# Patient Record
Sex: MALE | Race: BLACK OR AFRICAN AMERICAN | NOT HISPANIC OR LATINO | URBAN - METROPOLITAN AREA
[De-identification: names, ages, dates, MRNs, and addresses within clinical notes are randomized per-mention and may not be internally consistent; named-entity substitution may affect disease eponyms.]

---

## 2023-05-15 VITALS
DIASTOLIC BLOOD PRESSURE: 71 MMHG | WEIGHT: 315 LBS | TEMPERATURE: 98 F | SYSTOLIC BLOOD PRESSURE: 145 MMHG | HEART RATE: 62 BPM | RESPIRATION RATE: 18 BRPM | OXYGEN SATURATION: 100 % | HEIGHT: 76 IN

## 2023-05-15 RX ORDER — CHLORHEXIDINE GLUCONATE 213 G/1000ML
1 SOLUTION TOPICAL ONCE
Refills: 0 | Status: DISCONTINUED | OUTPATIENT
Start: 2023-05-17 | End: 2023-05-31

## 2023-05-15 NOTE — H&P ADULT - NSICDXPASTMEDICALHX_GEN_ALL_CORE_FT
PAST MEDICAL HISTORY:  CVA (cerebrovascular accident)     Diabetes mellitus     HTN (hypertension)     Hyperlipidemia      PAST MEDICAL HISTORY:  CVA (cerebrovascular accident)     Diabetes mellitus     Gout     HTN (hypertension)     Hyperlipidemia

## 2023-05-15 NOTE — H&P ADULT - RESPIRATORY
normal/clear to auscultation bilaterally/no wheezes/no rales/no rhonchi/no respiratory distress/no use of accessory muscles/breath sounds equal/good air movement

## 2023-05-15 NOTE — H&P ADULT - ASSESSMENT
61 y/o M with PMHx HTN, HLD, DM2, CVA (5/20/21, ?residual deficits), Afib (on Xarelto, last dose 5/14/23 AM) who presented to Cardiologist Dr. Avila c/o CHERIE with four flights of stairs. Pt reports he was referred for cardiac cath after an abnormal NST per MD.    In light of pt's risk factors, CCS Anginal class II, abnormal NST per MD, patient referred for cardiac cath with possible intervention to r/o underlying CAD.     EKG: 			  ASA 	III  Mallampati class:   Anginal Class: II    -No Known Allergies    -H/H = 13.9/41.4  . Pt denies BRBPR, hematuria, hematochezia, melena. Pt endorses compliance w/ home ________, stating last dose was ______. Pt loaded w/ ASA ___ mg x1 and Plavix ___ mg x1  -BUN/Cr = . EF ___. Euvolemic on exam. IV NS @ ___ cc bolus followed by ___ cc/hr x ___ hrs started pre procedure    Sedation Plan:   Moderate  Patient Is Suitable Candidate For Sedation?     Yes    Risks & benefits of procedure and alternative therapy have been explained to the patient including but not limited to: allergic reaction, bleeding with possible need for blood transfusion, infection, renal and vascular compromise, limb damage, arrhythmia, stroke, vessel dissection/perforation, myocardial infarction, and emergent CABG. Informed consent obtained at bedside and included in chart.   63 y/o M with PMHx HTN, HLD, DM2, CVA (5/20/21 with residual L sided weakness and L facial droop; walks w/ cane), AFib (on Xarelto, last dose 5/14/23 AM) who presented to Cardiologist Dr. Avila for annual screening, reporting CRESPO with four flights of stairs. Pt  referred for cardiac cath after an abnormal NST per MD.    In light of pt's risk factors, CCS Anginal class II, abnormal NST per MD, patient referred for cardiac cath with possible intervention to r/o underlying CAD.     EKG: 	Sinus halle @58bpm, low voltage EKG		  ASA 	III  Mallampati class: III  Anginal Class: II    -No Known Allergies    -H/H = 13.9/41.4  . Pt denies BRBPR, hematuria, hematochezia, melena. Pt endorses compliance w/ home Xarelto, stating last dose was  5/14/23 AM. Pt loaded w/ ASA 325mg x1 and Plavix 600mg x1  -BUN/Cr = . EF is normal per MD Avila. Euvolemic on cardiac exam; vascularly- minimal 1+ nonpitting LE edema at posterior ankles. IV NS @ 250 cc bolus followed by 75cc/hr x 2hrs started pre procedure    Sedation Plan:   Moderate  Patient Is Suitable Candidate For Sedation?     Yes    Risks & benefits of procedure and alternative therapy have been explained to the patient including but not limited to: allergic reaction, bleeding with possible need for blood transfusion, infection, renal and vascular compromise, limb damage, arrhythmia, stroke, vessel dissection/perforation, myocardial infarction, and emergent CABG. Informed consent obtained at bedside and included in chart.   63 y/o M with PMHx HTN, HLD, DM2, CVA (5/20/21 with residual L sided weakness and L facial droop; walks w/ cane), AFib (on Xarelto, last dose 5/14/23 AM) who presented to Cardiologist Dr. Avila for annual screening, reporting CRESPO with four flights of stairs. Pt  referred for cardiac cath after an abnormal NST per MD.    In light of pt's risk factors, CCS Anginal class II, abnormal NST per MD, patient referred for cardiac cath with possible intervention to r/o underlying CAD.     EKG: 	Sinus halle @58bpm, low voltage EKG		  ASA 	III  Mallampati class: III  Anginal Class: II    -No Known Allergies    -H/H = 13.9/41.4  -Endorses recent colonoscopy (~2wks ago, unsure of results) where he had mild bleeding hemorrhoids which have since resolved.  Pt denies current BRBPR, hematuria, hematochezia, melena.   -Pt endorses compliance w/ home Xarelto, stating last dose was  5/14/23 AM. Pt loaded w/ ASA 325mg x1 and Plavix 600mg x1  -BUN/Cr = 11/1.27.   -EF is normal per MD Avila. Euvolemic on cardiac exam; vascularly- minimal 1+ nonpitting LE edema at posterior ankles. IV NS @ 250 cc bolus followed by 75cc/hr x 2hrs started pre procedure    Sedation Plan:   Moderate  Patient Is Suitable Candidate For Sedation?     Yes    Risks & benefits of procedure and alternative therapy have been explained to the patient including but not limited to: allergic reaction, bleeding with possible need for blood transfusion, infection, renal and vascular compromise, limb damage, arrhythmia, stroke, vessel dissection/perforation, myocardial infarction, and emergent CABG. Informed consent obtained at bedside and included in chart.

## 2023-05-15 NOTE — H&P ADULT - NSICDXPASTSURGICALHX_GEN_ALL_CORE_FT
PAST SURGICAL HISTORY:  H/O gastric sleeve      PAST SURGICAL HISTORY:  H/O gastric sleeve     History of cholecystectomy

## 2023-05-15 NOTE — H&P ADULT - MOTOR
L sided deficits to CN VII- cannot fully close L eye, cannot raise L eyebrows, facial droop to L side of mouth, asymmetric smile, weak L cheek inflation.  CN XI: decreased L shoulder and SCM strengh; CN XII- asymmetric tongue protrusion (deviates to pt's right)

## 2023-05-15 NOTE — H&P ADULT - NSHPLABSRESULTS_GEN_ALL_CORE
13.9   5.68  )-----------( 225      ( 17 May 2023 09:03 )             41.4       05-17    141  |  106  |  11  ----------------------------<  171<H>  4.3   |  28  |  1.27    Ca    9.3      17 May 2023 09:03    TPro  7.2  /  Alb  4.0  /  TBili  0.7  /  DBili  x   /  AST  25  /  ALT  32  /  AlkPhos  103  05-17      PT/INR - ( 17 May 2023 09:03 )   PT: 12.7 sec;   INR: 1.07          PTT - ( 17 May 2023 09:03 )  PTT:33.1 sec

## 2023-05-15 NOTE — H&P ADULT - HISTORY OF PRESENT ILLNESS
History  obtained with assistance of Dr. Avila note    Cardiologist: Dr. Avila  Escort:    61 yo M with PMHx History  obtained with assistance of Dr. Avila note    Cardiologist: Dr. Avila  Escort:    63 yo M with PMHx HTN, HLD, DM2, CVA (5/20/21, ?residual deficits), unspecified Afib (on Xarelto, last dose __) who presented to Cardiologist Dr. Avila c/o     Denies CP, SOB, dizziness, diaphoresis, palpitations, fatigue, LE edema, orthopnea, PND, syncope, N/V, abdominal pain, f/c, sick contact, recent travel.    In light of pt's risk factors, CCS Anginal class __sx, abnormal ___, patient referred for cardiac cath with possible intervention to r/o underlying CAD.  History  obtained with assistance of Dr. Avila note    Cardiologist: Dr. Avila  Pharmacy:  Escort:    61 y/o M with PMHx HTN, HLD, DM2, CVA (5/20/21, ?residual deficits), Afib (on Xarelto, last dose 5/14/23 AM) who presented to Cardiologist Dr. Avila c/o CRESPO with four flights of stairs. Pt reports he was referred for cardiac cath after an abnormal NST.    Denies CP, SOB, dizziness, diaphoresis, palpitations, fatigue, LE edema, orthopnea, PND, syncope, N/V, abdominal pain, f/c, sick contact, recent travel.    In light of pt's risk factors, CCS Anginal class __, abnormal NST, patient referred for cardiac cath with possible intervention to r/o underlying CAD.  History  obtained with assistance of Dr. Avila note    Cardiologist: Dr. Avila  Pharmacy:  Escort:    63 y/o M with PMHx HTN, HLD, DM2, CVA (5/20/21, ?residual deficits), Afib (on Xarelto, last dose 5/14/23 AM) who presented to Cardiologist Dr. Avila c/o CRESPO with four flights of stairs. Pt reports he was referred for cardiac cath after an abnormal NST per MD.    Denies CP, SOB, dizziness, diaphoresis, palpitations, fatigue, LE edema, orthopnea, PND, syncope, N/V, abdominal pain, f/c, sick contact, recent travel.    In light of pt's risk factors, CCS Anginal class __, abnormal NST per MD, patient referred for cardiac cath with possible intervention to r/o underlying CAD.  History  obtained with assistance of Dr. Avila note    Cardiologist: Dr. Avila  Pharmacy:  Escort:    63 y/o M with PMHx HTN, HLD, DM2, CVA (5/20/21, ?residual deficits), Afib (on Xarelto, last dose 5/14/23 AM) who presented to Cardiologist Dr. Avila c/o CRESPO with four flights of stairs. Pt reports he was referred for cardiac cath after an abnormal NST per MD.    Denies CP, SOB, dizziness, diaphoresis, palpitations, fatigue, LE edema, orthopnea, PND, syncope, N/V, abdominal pain, f/c, sick contact, recent travel.    In light of pt's risk factors, CCS Anginal class II, abnormal NST per MD, patient referred for cardiac cath with possible intervention to r/o underlying CAD.  History  obtained with assistance of Dr. Avila note    Cardiologist: Dr. Avila  Pharmacy: [facility] [address] in [place]  Escort: son    63 y/o M with PMHx HTN, HLD, DM2, CVA (5/20/21 with residual L sided weakness and L facial droop; walks w/ cane), AFib (on Xarelto, last dose 5/14/23 AM) who presented to Cardiologist Dr. Avila for annual screening, reporting CRESPO with four flights of stairs. Pt  referred for cardiac cath after an abnormal NST per MD.  Endorses recent colonoscopy (unsure of results) where he had mild bleeding hemorrhoids which have since resolved.      Denies CP, SOB, dizziness, diaphoresis, palpitations, fatigue, LE edema, orthopnea, PND, syncope, N/V, abdominal pain, diarrhea, hematochezia, f/c, sick contact, recent travel.    In light of pt's risk factors, CCS Anginal class II, abnormal NST per MD, patient referred for cardiac cath with possible intervention to r/o underlying CAD.  History  obtained with assistance of Dr. Avila note    Cardiologist: Dr. Avila  Pharmacy: [facility] [address] in [place]  Escort: son    61 y/o M with PMHx HTN, HLD, DM2, CVA (5/20/21 with residual L sided weakness and L facial droop; walks w/ cane), AFib (on Xarelto, last dose 5/14/23 AM) who presented to Cardiologist Dr. Avila for annual screening, reporting CRESPO with four flights of stairs. Pt  referred for cardiac cath after an abnormal NST per MD.  Endorses recent colonoscopy (~2wks ago, unsure of results) where he had mild bleeding hemorrhoids which have since resolved.      Denies CP, SOB, dizziness, diaphoresis, palpitations, fatigue, LE edema, orthopnea, PND, syncope, N/V, abdominal pain, diarrhea, hematochezia, f/c, sick contact, recent travel.    In light of pt's risk factors, CCS Anginal class II, abnormal NST per MD, patient referred for cardiac cath with possible intervention to r/o underlying CAD.  Cardiologist: Dr. Avila  Pharmacy: [facility] [address] in [place]  Escort: son    61 y/o M with PMHx HTN, HLD, DM2, CVA (5/20/21 with residual L sided weakness and L facial droop; walks w/ cane), AFib (on Xarelto, last dose 5/14/23 AM) who presented to Cardiologist Dr. Avila for annual screening, reporting CRESPO with four flights of stairs. Pt  referred for cardiac cath after an abnormal NST per MD.  Endorses recent colonoscopy (~2wks ago, unsure of results) where he had mild bleeding hemorrhoids which have since resolved.      Denies CP, SOB, dizziness, diaphoresis, palpitations, fatigue, LE edema, orthopnea, PND, syncope, N/V, abdominal pain, diarrhea, hematochezia, f/c, sick contact, recent travel.    In light of pt's risk factors, CCS Anginal class II, abnormal NST per MD, patient referred for cardiac cath with possible intervention to r/o underlying CAD.  Cardiologist: Dr. Avila  Pharmacy: [facility] [address] in [place]  Escort: son    61 y/o M with PMHx HTN, HLD, DM2, CVA (5/20/21 with residual L sided weakness and L facial droop; walks w/ cane), AFib (on Xarelto, last dose 5/14/23 AM) who presented to Cardiologist Dr. Avila for annual screening, reporting CRESPO with four flights of stairs. Pt  referred for cardiac cath after an abnormal NST per MD.  Endorses recent colonoscopy (~2wks ago, unsure of results) where he had mild bleeding hemorrhoids which have since resolved.  Denies CP, SOB, dizziness, diaphoresis, palpitations, fatigue, LE edema, orthopnea, PND, syncope, N/V, abdominal pain, diarrhea, hematochezia, f/c, sick contact, recent travel.    In light of pt's risk factors, CCS Anginal class II, abnormal NST  per MD, patient referred for cardiac cath with possible intervention to r/o underlying CAD.

## 2023-05-15 NOTE — H&P ADULT - NSICDXFAMILYHX_GEN_ALL_CORE_FT
FAMILY HISTORY:  Mother  Still living? Unknown  Family history of CABG, Age at diagnosis: Age Unknown

## 2023-05-16 RX ORDER — RIVAROXABAN 15 MG-20MG
1 KIT ORAL
Refills: 0 | DISCHARGE

## 2023-05-16 RX ORDER — ASPIRIN/CALCIUM CARB/MAGNESIUM 324 MG
1 TABLET ORAL
Refills: 0 | DISCHARGE

## 2023-05-17 ENCOUNTER — OUTPATIENT (OUTPATIENT)
Dept: OUTPATIENT SERVICES | Facility: HOSPITAL | Age: 63
LOS: 1 days | Discharge: HOME | End: 2023-05-17
Payer: COMMERCIAL

## 2023-05-17 DIAGNOSIS — Z90.3 ACQUIRED ABSENCE OF STOMACH [PART OF]: Chronic | ICD-10-CM

## 2023-05-17 DIAGNOSIS — Z90.49 ACQUIRED ABSENCE OF OTHER SPECIFIED PARTS OF DIGESTIVE TRACT: Chronic | ICD-10-CM

## 2023-05-17 LAB
ALBUMIN SERPL ELPH-MCNC: 4 G/DL — SIGNIFICANT CHANGE UP (ref 3.3–5)
ALP SERPL-CCNC: 103 U/L — SIGNIFICANT CHANGE UP (ref 40–120)
ALT FLD-CCNC: 32 U/L — SIGNIFICANT CHANGE UP (ref 10–45)
ANION GAP SERPL CALC-SCNC: 7 MMOL/L — SIGNIFICANT CHANGE UP (ref 5–17)
APTT BLD: 33.1 SEC — SIGNIFICANT CHANGE UP (ref 27.5–35.5)
AST SERPL-CCNC: 25 U/L — SIGNIFICANT CHANGE UP (ref 10–40)
BASOPHILS # BLD AUTO: 0.03 K/UL — SIGNIFICANT CHANGE UP (ref 0–0.2)
BASOPHILS NFR BLD AUTO: 0.5 % — SIGNIFICANT CHANGE UP (ref 0–2)
BILIRUB SERPL-MCNC: 0.7 MG/DL — SIGNIFICANT CHANGE UP (ref 0.2–1.2)
BUN SERPL-MCNC: 11 MG/DL — SIGNIFICANT CHANGE UP (ref 7–23)
CALCIUM SERPL-MCNC: 9.3 MG/DL — SIGNIFICANT CHANGE UP (ref 8.4–10.5)
CHLORIDE SERPL-SCNC: 106 MMOL/L — SIGNIFICANT CHANGE UP (ref 96–108)
CHOLEST SERPL-MCNC: 148 MG/DL — SIGNIFICANT CHANGE UP
CK MB CFR SERPL CALC: 1.4 NG/ML — SIGNIFICANT CHANGE UP (ref 0–6.7)
CK SERPL-CCNC: 145 U/L — SIGNIFICANT CHANGE UP (ref 30–200)
CO2 SERPL-SCNC: 28 MMOL/L — SIGNIFICANT CHANGE UP (ref 22–31)
CREAT SERPL-MCNC: 1.27 MG/DL — SIGNIFICANT CHANGE UP (ref 0.5–1.3)
EGFR: 65 ML/MIN/1.73M2 — SIGNIFICANT CHANGE UP
EOSINOPHIL # BLD AUTO: 0.21 K/UL — SIGNIFICANT CHANGE UP (ref 0–0.5)
EOSINOPHIL NFR BLD AUTO: 3.7 % — SIGNIFICANT CHANGE UP (ref 0–6)
GLUCOSE BLDC GLUCOMTR-MCNC: 116 MG/DL — HIGH (ref 70–99)
GLUCOSE BLDC GLUCOMTR-MCNC: 165 MG/DL — HIGH (ref 70–99)
GLUCOSE SERPL-MCNC: 171 MG/DL — HIGH (ref 70–99)
HCT VFR BLD CALC: 41.4 % — SIGNIFICANT CHANGE UP (ref 39–50)
HDLC SERPL-MCNC: 37 MG/DL — LOW
HGB BLD-MCNC: 13.9 G/DL — SIGNIFICANT CHANGE UP (ref 13–17)
IMM GRANULOCYTES NFR BLD AUTO: 0.4 % — SIGNIFICANT CHANGE UP (ref 0–0.9)
INR BLD: 1.07 — SIGNIFICANT CHANGE UP (ref 0.88–1.16)
LIPID PNL WITH DIRECT LDL SERPL: 90 MG/DL — SIGNIFICANT CHANGE UP
LYMPHOCYTES # BLD AUTO: 1.75 K/UL — SIGNIFICANT CHANGE UP (ref 1–3.3)
LYMPHOCYTES # BLD AUTO: 30.8 % — SIGNIFICANT CHANGE UP (ref 13–44)
MAGNESIUM SERPL-MCNC: 1.9 MG/DL — SIGNIFICANT CHANGE UP (ref 1.6–2.6)
MCHC RBC-ENTMCNC: 26.9 PG — LOW (ref 27–34)
MCHC RBC-ENTMCNC: 33.6 GM/DL — SIGNIFICANT CHANGE UP (ref 32–36)
MCV RBC AUTO: 80.1 FL — SIGNIFICANT CHANGE UP (ref 80–100)
MONOCYTES # BLD AUTO: 0.56 K/UL — SIGNIFICANT CHANGE UP (ref 0–0.9)
MONOCYTES NFR BLD AUTO: 9.9 % — SIGNIFICANT CHANGE UP (ref 2–14)
NEUTROPHILS # BLD AUTO: 3.11 K/UL — SIGNIFICANT CHANGE UP (ref 1.8–7.4)
NEUTROPHILS NFR BLD AUTO: 54.7 % — SIGNIFICANT CHANGE UP (ref 43–77)
NON HDL CHOLESTEROL: 111 MG/DL — SIGNIFICANT CHANGE UP
NRBC # BLD: 0 /100 WBCS — SIGNIFICANT CHANGE UP (ref 0–0)
PLATELET # BLD AUTO: 225 K/UL — SIGNIFICANT CHANGE UP (ref 150–400)
POTASSIUM SERPL-MCNC: 4.3 MMOL/L — SIGNIFICANT CHANGE UP (ref 3.5–5.3)
POTASSIUM SERPL-SCNC: 4.3 MMOL/L — SIGNIFICANT CHANGE UP (ref 3.5–5.3)
PROT SERPL-MCNC: 7.2 G/DL — SIGNIFICANT CHANGE UP (ref 6–8.3)
PROTHROM AB SERPL-ACNC: 12.7 SEC — SIGNIFICANT CHANGE UP (ref 10.5–13.4)
RBC # BLD: 5.17 M/UL — SIGNIFICANT CHANGE UP (ref 4.2–5.8)
RBC # FLD: 13.7 % — SIGNIFICANT CHANGE UP (ref 10.3–14.5)
SODIUM SERPL-SCNC: 141 MMOL/L — SIGNIFICANT CHANGE UP (ref 135–145)
TRIGL SERPL-MCNC: 104 MG/DL — SIGNIFICANT CHANGE UP
WBC # BLD: 5.68 K/UL — SIGNIFICANT CHANGE UP (ref 3.8–10.5)
WBC # FLD AUTO: 5.68 K/UL — SIGNIFICANT CHANGE UP (ref 3.8–10.5)

## 2023-05-17 PROCEDURE — 82550 ASSAY OF CK (CPK): CPT

## 2023-05-17 PROCEDURE — 93005 ELECTROCARDIOGRAM TRACING: CPT

## 2023-05-17 PROCEDURE — C1769: CPT

## 2023-05-17 PROCEDURE — C1887: CPT

## 2023-05-17 PROCEDURE — 93458 L HRT ARTERY/VENTRICLE ANGIO: CPT

## 2023-05-17 PROCEDURE — 85025 COMPLETE CBC W/AUTO DIFF WBC: CPT

## 2023-05-17 PROCEDURE — 85610 PROTHROMBIN TIME: CPT

## 2023-05-17 PROCEDURE — 80053 COMPREHEN METABOLIC PANEL: CPT

## 2023-05-17 PROCEDURE — 93010 ELECTROCARDIOGRAM REPORT: CPT

## 2023-05-17 PROCEDURE — 82553 CREATINE MB FRACTION: CPT

## 2023-05-17 PROCEDURE — 80061 LIPID PANEL: CPT

## 2023-05-17 PROCEDURE — 85730 THROMBOPLASTIN TIME PARTIAL: CPT

## 2023-05-17 PROCEDURE — 83735 ASSAY OF MAGNESIUM: CPT

## 2023-05-17 PROCEDURE — 93458 L HRT ARTERY/VENTRICLE ANGIO: CPT | Mod: 26

## 2023-05-17 PROCEDURE — 36415 COLL VENOUS BLD VENIPUNCTURE: CPT

## 2023-05-17 PROCEDURE — 82962 GLUCOSE BLOOD TEST: CPT

## 2023-05-17 PROCEDURE — C1894: CPT

## 2023-05-17 RX ORDER — SODIUM CHLORIDE 9 MG/ML
250 INJECTION INTRAMUSCULAR; INTRAVENOUS; SUBCUTANEOUS ONCE
Refills: 0 | Status: COMPLETED | OUTPATIENT
Start: 2023-05-17 | End: 2023-05-17

## 2023-05-17 RX ORDER — ASPIRIN/CALCIUM CARB/MAGNESIUM 324 MG
325 TABLET ORAL ONCE
Refills: 0 | Status: COMPLETED | OUTPATIENT
Start: 2023-05-17 | End: 2023-05-17

## 2023-05-17 RX ORDER — SODIUM CHLORIDE 9 MG/ML
500 INJECTION INTRAMUSCULAR; INTRAVENOUS; SUBCUTANEOUS
Refills: 0 | Status: DISCONTINUED | OUTPATIENT
Start: 2023-05-17 | End: 2023-05-31

## 2023-05-17 RX ORDER — GLIMEPIRIDE 1 MG
1 TABLET ORAL
Refills: 0 | DISCHARGE

## 2023-05-17 RX ORDER — METOPROLOL TARTRATE 50 MG
1 TABLET ORAL
Refills: 0 | DISCHARGE

## 2023-05-17 RX ORDER — COLCHICINE 0.6 MG
1 TABLET ORAL
Refills: 0 | DISCHARGE

## 2023-05-17 RX ORDER — DEXTROSE 50 % IN WATER 50 %
15 SYRINGE (ML) INTRAVENOUS ONCE
Refills: 0 | Status: DISCONTINUED | OUTPATIENT
Start: 2023-05-17 | End: 2023-05-31

## 2023-05-17 RX ORDER — INSULIN LISPRO 100/ML
VIAL (ML) SUBCUTANEOUS ONCE
Refills: 0 | Status: DISCONTINUED | OUTPATIENT
Start: 2023-05-17 | End: 2023-05-31

## 2023-05-17 RX ORDER — DEXTROSE 50 % IN WATER 50 %
25 SYRINGE (ML) INTRAVENOUS ONCE
Refills: 0 | Status: DISCONTINUED | OUTPATIENT
Start: 2023-05-17 | End: 2023-05-31

## 2023-05-17 RX ORDER — GLUCAGON INJECTION, SOLUTION 0.5 MG/.1ML
1 INJECTION, SOLUTION SUBCUTANEOUS ONCE
Refills: 0 | Status: DISCONTINUED | OUTPATIENT
Start: 2023-05-17 | End: 2023-05-31

## 2023-05-17 RX ORDER — ATORVASTATIN CALCIUM 80 MG/1
1 TABLET, FILM COATED ORAL
Refills: 0 | DISCHARGE

## 2023-05-17 RX ORDER — CLOPIDOGREL BISULFATE 75 MG/1
600 TABLET, FILM COATED ORAL ONCE
Refills: 0 | Status: COMPLETED | OUTPATIENT
Start: 2023-05-17 | End: 2023-05-17

## 2023-05-17 RX ORDER — SODIUM CHLORIDE 9 MG/ML
1000 INJECTION, SOLUTION INTRAVENOUS
Refills: 0 | Status: DISCONTINUED | OUTPATIENT
Start: 2023-05-17 | End: 2023-05-31

## 2023-05-17 RX ORDER — GABAPENTIN 400 MG/1
1 CAPSULE ORAL
Refills: 0 | DISCHARGE

## 2023-05-17 RX ORDER — DEXTROSE 50 % IN WATER 50 %
12.5 SYRINGE (ML) INTRAVENOUS ONCE
Refills: 0 | Status: DISCONTINUED | OUTPATIENT
Start: 2023-05-17 | End: 2023-05-31

## 2023-05-17 RX ADMIN — Medication 325 MILLIGRAM(S): at 11:10

## 2023-05-17 RX ADMIN — CLOPIDOGREL BISULFATE 600 MILLIGRAM(S): 75 TABLET, FILM COATED ORAL at 11:10

## 2023-05-17 RX ADMIN — SODIUM CHLORIDE 250 MILLILITER(S): 9 INJECTION INTRAMUSCULAR; INTRAVENOUS; SUBCUTANEOUS at 14:14

## 2023-05-17 RX ADMIN — SODIUM CHLORIDE 1000 MILLILITER(S): 9 INJECTION INTRAMUSCULAR; INTRAVENOUS; SUBCUTANEOUS at 11:11

## 2023-05-17 RX ADMIN — SODIUM CHLORIDE 75 MILLILITER(S): 9 INJECTION INTRAMUSCULAR; INTRAVENOUS; SUBCUTANEOUS at 11:10

## 2023-05-17 NOTE — PROGRESS NOTE ADULT - SUBJECTIVE AND OBJECTIVE BOX
Interventional Cardiology PA SDA Discharge Note    Patient without complaints. Ambulated and voided without difficulties    Afebrile, VSS    Ext:    		  		        Right          Radial :  no  hematoma,  no   bleeding, dressing; C/D/I      Pulses:    intact RAD    A/P:      63 y/o M with PMHx HTN, HLD, DM2, CVA (5/20/21 with residual L sided weakness and L facial droop; walks w/ cane), AFib (on Xarelto, last dose 5/14/23 AM) who presented to Cardiologist Dr. Avila for annual screening, reporting CRESPO with four flights of stairs. Pt  referred for cardiac cath after an abnormal NST per MD.  Endorses recent colonoscopy (~2wks ago, unsure of results) where he had mild bleeding hemorrhoids which have since resolved.  Denies CP, SOB, dizziness, diaphoresis, palpitations, fatigue, LE edema, orthopnea, PND, syncope, N/V, abdominal pain, diarrhea, hematochezia, f/c, sick contact, recent travel. In light of pt's risk factors, CCS Anginal class II, abnormal NST  per MD, patient referred for cardiac cath with possible intervention to r/o underlying CAD.     Patient now s/p cardiac cath 5/17/23: normal coronary anatomy, slow flow in RCA, tortuous vessels. EDP 11mmHg. R TR.     Patient will resume his Xarelto tomorrow 5/18/23 as discussed w/ interventional fellow.     Patient examined at bedside without any clinical complaints.       1.	Stable for discharge as per attending Dr. Avila after bed rest, pt voids, groin/wrist stable and 30 minutes of ambulation.  2.	Follow-up with PMD/CardiologistMargarita in 1-2 weeks  3.	Discharged forms signed and copies in chart

## 2023-05-18 DIAGNOSIS — I20.0 UNSTABLE ANGINA: ICD-10-CM

## 2023-05-18 DIAGNOSIS — R94.39 ABNORMAL RESULT OF OTHER CARDIOVASCULAR FUNCTION STUDY: ICD-10-CM
